# Patient Record
Sex: FEMALE | Race: WHITE | Employment: FULL TIME | URBAN - METROPOLITAN AREA
[De-identification: names, ages, dates, MRNs, and addresses within clinical notes are randomized per-mention and may not be internally consistent; named-entity substitution may affect disease eponyms.]

---

## 2017-07-29 PROBLEM — N60.19 FIBROCYSTIC BREAST DISEASE: Status: ACTIVE | Noted: 2017-07-29

## 2017-07-29 PROBLEM — N60.19 FIBROCYSTIC BREAST DISEASE: Chronic | Status: ACTIVE | Noted: 2017-07-29

## 2017-07-29 PROBLEM — Z88.9 H/O SEASONAL ALLERGIES: Status: ACTIVE | Noted: 2017-07-29

## 2017-08-22 PROBLEM — Z82.79 FAMILY HISTORY OF DOWNS SYNDROME: Status: ACTIVE | Noted: 2017-08-22

## 2017-08-22 PROBLEM — Z83.49 FAMILY HISTORY OF CYSTIC FIBROSIS: Status: ACTIVE | Noted: 2017-08-22

## 2017-10-16 PROBLEM — O44.02 COMPLETE PLACENTA PREVIA NOS OR WITHOUT HEMORRHAGE, SECOND TRIMESTER: Status: ACTIVE | Noted: 2017-10-16

## 2017-11-13 PROBLEM — Z34.90 PREGNANCY: Status: ACTIVE | Noted: 2017-11-13

## 2018-01-11 PROBLEM — O26.849 FETAL SIZE INCONSISTENT WITH DATES: Status: ACTIVE | Noted: 2018-01-11

## 2018-02-20 ENCOUNTER — HOSPITAL ENCOUNTER (OUTPATIENT)
Age: 33
Discharge: HOME | End: 2018-02-20
Attending: OBSTETRICS & GYNECOLOGY | Admitting: OBSTETRICS & GYNECOLOGY
Payer: COMMERCIAL

## 2018-02-20 VITALS
DIASTOLIC BLOOD PRESSURE: 59 MMHG | RESPIRATION RATE: 16 BRPM | WEIGHT: 200 LBS | BODY MASS INDEX: 30.31 KG/M2 | SYSTOLIC BLOOD PRESSURE: 106 MMHG | HEIGHT: 68 IN | HEART RATE: 100 BPM | TEMPERATURE: 98.3 F

## 2018-02-20 PROBLEM — O36.8190 DECREASED FETAL MOVEMENT: Status: ACTIVE | Noted: 2018-02-20

## 2018-02-20 LAB
APPEARANCE UR: CLEAR
BILIRUB UR QL: NEGATIVE
COLOR UR: YELLOW
GLUCOSE UR QL STRIP.AUTO: NEGATIVE MG/DL
KETONES UR-MCNC: NEGATIVE MG/DL
LEUKOCYTE ESTERASE UR QL STRIP: ABNORMAL
NITRITE UR QL: NEGATIVE
PH UR: 6 [PH] (ref 5–9)
PROT UR QL: 30 MG/DL
RBC # UR STRIP: ABNORMAL /UL
SERVICE CMNT-IMP: ABNORMAL
SP GR UR: 1.02 (ref 1–1.02)
UROBILINOGEN UR QL: 0.2 EU/DL (ref 0.2–1)

## 2018-02-20 PROCEDURE — 99282 EMERGENCY DEPT VISIT SF MDM: CPT

## 2018-02-20 PROCEDURE — 81003 URINALYSIS AUTO W/O SCOPE: CPT

## 2018-02-20 PROCEDURE — 59025 FETAL NON-STRESS TEST: CPT

## 2018-02-20 NOTE — IP AVS SNAPSHOT
[address] [address] 
[phone] Patient: Sarah Alcantar MRN: [id number] OTJ:1[birth year]387 A check karma indicates which time of day the medication should be taken. My Medications ASK your doctor about these medications Instructions Each Dose to Equal  
 Morning Noon Evening Bedtime BENADRYL 25 mg capsule Generic drug:  diphenhydrAMINE Your last dose was: Your next dose is: Take 25 mg by mouth every six (6) hours as needed. 25 mg  
    
   
   
   
  
 calcium carbonate 200 mg calcium (500 mg) Chew Commonly known as:  TUMS Your last dose was: Your next dose is: Take 1 Tab by mouth daily. 1 Tab COLACE 100 mg capsule Generic drug:  docusate sodium Your last dose was: Your next dose is: Take 100 mg by mouth two (2) times a day. 100 mg Iron 325 mg (65 mg iron) tablet Generic drug:  ferrous sulfate Your last dose was: Your next dose is: Take  by mouth Daily (before breakfast). levothyroxine 150 mcg tablet Commonly known as:  SYNTHROID Your last dose was: Your next dose is: TAKE 1 TABLET BY ORAL ROUTE EVERY DAY PRENATAL DHA+COMPLETE PRENATAL -300 mg-mcg-mg Cmpk Generic drug:  [id number] rigo-folic-dha Your last dose was: Your next dose is: Take  by mouth. SUCRETS COUGH CONTROL PO Your last dose was: Your next dose is: Take  by mouth. TYLENOL PM PO Your last dose was: Your next dose is: Take  by mouth. Eddie Manrique EX Your last dose was: Your next dose is:    
   
   
 by Apply Externally route. ZANTAC 150 mg tablet Generic drug:  raNITIdine Your last dose was: Your next dose is: Take 150 mg by mouth two (2) times a day.   
 150 mg

## 2018-02-20 NOTE — IP AVS SNAPSHOT
Summary of Care Report The Summary of Care report has been created to help improve care coordination. Users with access to [employer] or [address] (Web-based application) may access additional patient information including the Discharge Summary. If you are not currently a [address] user and need more information, please call the number listed below in the [place] [address] section and ask to be connected with Medical Records. Facility Information Name Address Phone [address] [address] [phone] Patient Information Patient Name Sex  Kranthi Haas ([id number]) Female 1985 Discharge Information Admitting Provider Service Area Unit Travon Lucio MD / [address] [place] [address] [address] Triage / [phone] Discharge Provider Discharge Date/Time Discharge Disposition Destination (none) (none) (none) (none) Patient Language Language ENGLISH [13] Hospital Problems as of 2018  Reviewed: 2018 11:23 AM by Rosey Fair NP Class Noted - Resolved Last Modified POA Active Problems Decreased fetal movement  2018 - Present 2018 by Travon Lucio MD Unknown Entered by Travon Lucio MD  
  
Non-Hospital Problems as of 2018  Reviewed: 2018 11:23 AM by Rosey Fair NP Class Noted - Resolved Last Modified Active Problems History of thyroid cancer (Chronic)  2014 - Present 2018 by Rosey Fair NP Entered by Kathleen Zapata MD  
  Overview Signed 2018  9:31 AM by DOROTHY Jorgensen Dr. H/O seasonal allergies  2017 - Present 2017 by Valentín Dueñas Entered by Valentín Dueñas Fibrocystic breast disease (Chronic)  2017 - Present 2017 by Valentín Dueñas Entered by Marcos Evans Family history of Downs syndrome  8/22/2017 - Present 8/22/2017 by Gary Camilo NP Entered by Gary Camilo NP Overview Signed 8/22/2017  9:39 AM by Gary Camilo NP Declined MFM referral 
  
  
  Family history of cystic fibrosis  8/22/2017 - Present 8/22/2017 by Gayr Camilo NP Entered by Gary Camilo NP Overview Signed 8/22/2017  9:39 AM by Gary Camilo NP Declined CF screening Complete placenta previa nos or without hemorrhage, second trimester  10/16/2017 - Present 2/7/2018 by Gary Camilo NP Entered by Gary Camilo NP Overview Addendum 2/7/2018  9:31 AM by Gary Camilo NP  
   U/S at 28 wks to re-eval - resolved Pelvic rest 
Notify office of any VB immediately Pregnancy  11/13/2017 - Present 11/13/2017 by Juan David Herrera Entered by Juan David Herrera Fetal size inconsistent with dates  1/11/2018 - Present 2/7/2018 by Gary Camilo NP Entered by Juan David Herrera Overview Addendum 2/7/2018  9:31 AM by Gary Camilo NP  
   S>D. Will need US to evaluate. Sched 2/16. You are allergic to the following No active allergies Current Discharge Medication List  
  
ASK your doctor about these medications Dose & Instructions Dispensing Information Comments BENADRYL 25 mg capsule Generic drug:  diphenhydrAMINE Dose:  25 mg Take 25 mg by mouth every six (6) hours as needed. Refills:  0  
   
 calcium carbonate 200 mg calcium (500 mg) Chew Commonly known as:  TUMS Dose:  1 Tab Take 1 Tab by mouth daily. Refills:  0  
   
 COLACE 100 mg capsule Generic drug:  docusate sodium Dose:  100 mg Take 100 mg by mouth two (2) times a day. Refills:  0 Iron 325 mg (65 mg iron) tablet Generic drug:  ferrous sulfate Take  by mouth Daily (before breakfast). Refills:  0  
   
 levothyroxine 150 mcg tablet Commonly known as:  SYNTHROID  
 TAKE 1 TABLET BY ORAL ROUTE EVERY DAY Refills:  3 PRENATAL DHA+COMPLETE PRENATAL -300 mg-mcg-mg Cmpk Generic drug:  [id number] rigo-folic-dha Take  by mouth. Refills:  0 SUCRETS COUGH CONTROL PO Take  by mouth. Refills:  0  
   
 TYLENOL PM PO Take  by mouth. Refills:  0 VICKS VAPORUB EX  
 by Apply Externally route. Refills:  0  
   
 ZANTAC 150 mg tablet Generic drug:  raNITIdine Dose:  150 mg Take 150 mg by mouth two (2) times a day. Refills:  0 Current Immunizations Name Date Influenza Vaccine (Quad) PF 10/16/2017 Follow-up Information None Discharge Instructions Pregnancy Precautions: Care Instructions Your Care Instructions There is no sure way to prevent labor before your due date ( labor) or to prevent most other pregnancy problems. But there are things you can do to increase your chances of a healthy pregnancy. Go to your appointments, follow your doctor's advice, and take good care of yourself. Eat well, and exercise (if your doctor agrees). And make sure to drink plenty of water. Follow-up care is a key part of your treatment and safety. Be sure to make and go to all appointments, and call your doctor if you are having problems. It's also a good idea to know your test results and keep a list of the medicines you take. How can you care for yourself at home? · Make sure you go to your prenatal appointments. At each visit, your doctor will check your blood pressure. Your doctor will also check to see if you have protein in your urine. High blood pressure and protein in urine are signs of preeclampsia. This condition can be dangerous for you and your baby. · Drink plenty of fluids, enough so that your urine is light yellow or clear like water. Dehydration can cause contractions.  If you have kidney, heart, or liver disease and have to limit fluids, talk with your doctor before you increase the amount of fluids you drink. · Tell your doctor right away if you notice any symptoms of an infection, such as: ¨ Burning when you urinate. ¨ A foul-smelling discharge from your vagina. ¨ Vaginal itching. ¨ Unexplained fever. ¨ Unusual pain or soreness in your uterus or lower belly. · Eat a balanced diet. Include plenty of foods that are high in calcium and iron. ¨ Foods high in calcium include milk, cheese, yogurt, almonds, and broccoli. ¨ Foods high in iron include red meat, shellfish, poultry, eggs, beans, raisins, whole-grain bread, and leafy green vegetables. · Do not smoke. If you need help quitting, talk to your doctor about stop-smoking programs and medicines. These can increase your chances of quitting for good. · Do not drink alcohol or use illegal drugs. · Follow your doctor's directions about activity. Your doctor will let you know how much, if any, exercise you can do. · Ask your doctor if you can have sex. If you are at risk for early labor, your doctor may ask you to not have sex. · Take care to prevent falls. During pregnancy, your joints are loose, and your balance is off. Sports such as bicycling, skiing, or in-line skating can increase your risk of falling. And don't ride horses or motorcycles, dive, water ski, scuba dive, or parachute jump while you are pregnant. · Avoid getting very hot. Do not use saunas or hot tubs. Avoid staying out in the sun in hot weather for long periods. Take acetaminophen (Tylenol) to lower a high fever. · Do not take any over-the-counter or herbal medicines or supplements without talking to your doctor or pharmacist first. 
When should you call for help? Call 911 anytime you think you may need emergency care. For example, call if: 
? · You passed out (lost consciousness). ? · You have severe vaginal bleeding. ? · You have severe pain in your belly or pelvis. ? · You have had fluid gushing or leaking from your vagina and you know or think the umbilical cord is bulging into your vagina. If this happens, immediately get down on your knees so your rear end (buttocks) is higher than your head. This will decrease the pressure on the cord until help arrives. ?Call your doctor now or seek immediate medical care if: 
? · You have signs of preeclampsia, such as: 
¨ Sudden swelling of your face, hands, or feet. ¨ New vision problems (such as dimness or blurring). ¨ A severe headache. ? · You have any vaginal bleeding. ? · You have belly pain or cramping. ? · You have a fever. ? · You have had regular contractions (with or without pain) for an hour. This means that you have 8 or more within 1 hour or 4 or more in 20 minutes after you change your position and drink fluids. ? · You have a sudden release of fluid from your vagina. ? · You have low back pain or pelvic pressure that does not go away. ? · You notice that your baby has stopped moving or is moving much less than normal. ? Watch closely for changes in your health, and be sure to contact your doctor if you have any problems. Where can you learn more? Go to http://melany-charo.info/. Enter [id number] in the search box to learn more about \"Pregnancy Precautions: Care Instructions. \" Current as of: March 16, 2017 Content Version: 11.4 © 8672-0037 [employer]. Care instructions adapted under license by [employer] (which disclaims liability or warranty for this information). If you have questions about a medical condition or this instruction, always ask your healthcare professional. [place] [address] any warranty or liability for your use of this information. Chart Review Routing History No Routing History on File

## 2018-02-20 NOTE — PROGRESS NOTES
1708  at 38.3 weeks arrives to the unit with complaints of contractions since 1 this morning and decreased fetal movement. Pt admitted to triage room 3.    1735 SVE: /-2     1810 Pt up to ambulate unit.  Bedside and verbal report given to EBBE Marquez RN

## 2018-02-20 NOTE — IP AVS SNAPSHOT
Petra Goldman 
 
 
 [address] [facility] [address] 
[phone] Patient: Shreya Aguilar MRN: [id number] QZX:1/9/[birth year]011 About your hospitalization You were admitted on:  February 20, 2018 You last received care in the:  THE [place] [address] [facility] You were discharged on:  February 20, 2018 Why you were hospitalized Your primary diagnosis was:  Not on File Your diagnoses also included:  Decreased Fetal Movement Follow-up Information None Your Scheduled Appointments Friday February 23, 2018  8:50 AM EST  
OB VISIT with Dania Vásquez [facility] ([facility]) [address] [place] [id number] [phone] Friday March 02, 2018  9:40 AM EST  
OB VISIT with Hernandez Franklin NP  
[facility] ([address] [place] [address]) [address] [place] [id number] [phone] Discharge Orders None A check karma indicates which time of day the medication should be taken. My Medications ASK your doctor about these medications Instructions Each Dose to Equal  
 Morning Noon Evening Bedtime BENADRYL 25 mg capsule Generic drug:  diphenhydrAMINE Your last dose was: Your next dose is: Take 25 mg by mouth every six (6) hours as needed. 25 mg  
    
   
   
   
  
 calcium carbonate 200 mg calcium (500 mg) Chew Commonly known as:  TUMS Your last dose was: Your next dose is: Take 1 Tab by mouth daily. 1 Tab COLACE 100 mg capsule Generic drug:  docusate sodium Your last dose was: Your next dose is: Take 100 mg by mouth two (2) times a day. 100 mg Iron 325 mg (65 mg iron) tablet Generic drug:  ferrous sulfate Your last dose was: Your next dose is: Take  by mouth Daily (before breakfast). levothyroxine 150 mcg tablet Commonly known as:  SYNTHROID Your last dose was: Your next dose is: TAKE 1 TABLET BY ORAL ROUTE EVERY DAY PRENATAL DHA+COMPLETE PRENATAL -300 mg-mcg-mg Cmpk Generic drug:  [id number] rigo-folic-dha Your last dose was: Your next dose is: Take  by mouth. SUCRETS COUGH CONTROL PO Your last dose was: Your next dose is: Take  by mouth. TYLENOL PM PO Your last dose was: Your next dose is: Take  by mouth. Remus Dopp EX Your last dose was: Your next dose is:    
   
   
 by Apply Externally route. ZANTAC 150 mg tablet Generic drug:  raNITIdine Your last dose was: Your next dose is: Take 150 mg by mouth two (2) times a day. 150 mg Discharge Instructions Pregnancy Precautions: Care Instructions Your Care Instructions There is no sure way to prevent labor before your due date ( labor) or to prevent most other pregnancy problems. But there are things you can do to increase your chances of a healthy pregnancy. Go to your appointments, follow your doctor's advice, and take good care of yourself. Eat well, and exercise (if your doctor agrees). And make sure to drink plenty of water. Follow-up care is a key part of your treatment and safety. Be sure to make and go to all appointments, and call your doctor if you are having problems. It's also a good idea to know your test results and keep a list of the medicines you take. How can you care for yourself at home? · Make sure you go to your prenatal appointments.  At each visit, your doctor will check your blood pressure. Your doctor will also check to see if you have protein in your urine. High blood pressure and protein in urine are signs of preeclampsia. This condition can be dangerous for you and your baby. · Drink plenty of fluids, enough so that your urine is light yellow or clear like water. Dehydration can cause contractions. If you have kidney, heart, or liver disease and have to limit fluids, talk with your doctor before you increase the amount of fluids you drink. · Tell your doctor right away if you notice any symptoms of an infection, such as: ¨ Burning when you urinate. ¨ A foul-smelling discharge from your vagina. ¨ Vaginal itching. ¨ Unexplained fever. ¨ Unusual pain or soreness in your uterus or lower belly. · Eat a balanced diet. Include plenty of foods that are high in calcium and iron. ¨ Foods high in calcium include milk, cheese, yogurt, almonds, and broccoli. ¨ Foods high in iron include red meat, shellfish, poultry, eggs, beans, raisins, whole-grain bread, and leafy green vegetables. · Do not smoke. If you need help quitting, talk to your doctor about stop-smoking programs and medicines. These can increase your chances of quitting for good. · Do not drink alcohol or use illegal drugs. · Follow your doctor's directions about activity. Your doctor will let you know how much, if any, exercise you can do. · Ask your doctor if you can have sex. If you are at risk for early labor, your doctor may ask you to not have sex. · Take care to prevent falls. During pregnancy, your joints are loose, and your balance is off. Sports such as bicycling, skiing, or in-line skating can increase your risk of falling. And don't ride horses or motorcycles, dive, water ski, scuba dive, or parachute jump while you are pregnant. · Avoid getting very hot. Do not use saunas or hot tubs. Avoid staying out in the sun in hot weather for long periods.  Take acetaminophen (Tylenol) to lower a high fever. · Do not take any over-the-counter or herbal medicines or supplements without talking to your doctor or pharmacist first. 
When should you call for help? Call 911 anytime you think you may need emergency care. For example, call if: 
? · You passed out (lost consciousness). ? · You have severe vaginal bleeding. ? · You have severe pain in your belly or pelvis. ? · You have had fluid gushing or leaking from your vagina and you know or think the umbilical cord is bulging into your vagina. If this happens, immediately get down on your knees so your rear end (buttocks) is higher than your head. This will decrease the pressure on the cord until help arrives. ?Call your doctor now or seek immediate medical care if: 
? · You have signs of preeclampsia, such as: 
¨ Sudden swelling of your face, hands, or feet. ¨ New vision problems (such as dimness or blurring). ¨ A severe headache. ? · You have any vaginal bleeding. ? · You have belly pain or cramping. ? · You have a fever. ? · You have had regular contractions (with or without pain) for an hour. This means that you have 8 or more within 1 hour or 4 or more in 20 minutes after you change your position and drink fluids. ? · You have a sudden release of fluid from your vagina. ? · You have low back pain or pelvic pressure that does not go away. ? · You notice that your baby has stopped moving or is moving much less than normal. ? Watch closely for changes in your health, and be sure to contact your doctor if you have any problems. Where can you learn more? Go to http://melany-charo.info/. Enter [id number] in the search box to learn more about \"Pregnancy Precautions: Care Instructions. \" Current as of: March 16, 2017 Content Version: 11.4 © 9830-4860 [employer].  Care instructions adapted under license by [employer] (which disclaims liability or warranty for this information). If you have questions about a medical condition or this instruction, always ask your healthcare professional. [address] any warranty or liability for your use of this information. Introducing [facility] SERVICES! [employer] introduces [employer] patient portal. Now you can access parts of your medical record, email your doctor's office, and request medication refills online. 1. In your internet browser, go to [email or web address] 2. Click on the First Time User? Click Here link in the Sign In box. You will see the New Member Sign Up page. 3. Enter your [employer] Access Code exactly as it appears below. You will not need to use this code after youve completed the sign-up process. If you do not sign up before the expiration date, you must request a new code. · [employer] Access Code: [email or web address] Expires: 3/11/2018  9:45 AM 
 
4. Enter the last four digits of your Social Security Number (xxxx) and Date of Birth (mm/dd/yyyy) as indicated and click Submit. You will be taken to the next sign-up page. 5. Create a [employer] ID. This will be your [employer] login ID and cannot be changed, so think of one that is secure and easy to remember. 6. Create a [employer] password. You can change your password at any time. 7. Enter your Password Reset Question and Answer. This can be used at a later time if you forget your password. 8. Enter your e-mail address. You will receive e-mail notification when new information is available in [address] 9. Click Sign Up. You can now view and download portions of your medical record. 10. Click the Download Summary menu link to download a portable copy of your medical information. If you have questions, please visit the Frequently Asked Questions section of the [employer] website. Remember, [employer] is NOT to be used for urgent needs. For medical emergencies, dial 911. Now available from your iPhone and Android! Providers Seen During Your Hospitalization Provider Specialty Primary office phone Robert Bustamante MD Obstetrics & Gynecology [phone] Your Primary Care Physician (PCP) Primary Care Physician Office Phone Office Fax NONE ** None ** ** None ** You are allergic to the following No active allergies Recent Documentation Height Weight BMI OB Status Smoking Status 1.715 m 90.7 kg 30.86 kg/m2 Pregnant Never Smoker Emergency Contacts Name Discharge Info Relation Home Work Mobile [address] [place] [address] CAREGIVER [3] Spouse [3]   [phone] Patient Belongings The following personal items are in your possession at time of discharge: 
                             
 
  
  
 Please provide this summary of care documentation to your next provider. Signatures-by signing, you are acknowledging that this After Visit Summary has been reviewed with you and you have received a copy. Patient Signature:  ____________________________________________________________ Date:  ____________________________________________________________  
  
Devota Dannestor Provider Signature:  ____________________________________________________________ Date:  ____________________________________________________________

## 2018-02-21 NOTE — PROGRESS NOTES
80- Report received from Orest Mortimer, RN. Patient ambulating hallways. Recheck at 2000. Patient aware and verbalized understanding. 2005- SVE 2/50/-2, posterior, same as previous exam.    2050- Telephone call to Khushi Tabares MD. SBAR report given, strip reviewed including contraction pattern, and SVE. Orders to discharge patient at this time. 2100- I have reviewed discharge instructions with the patient and spouse. The patient and spouse verbalized understanding. Patient ambulated off unit with steady gait.

## 2018-02-21 NOTE — DISCHARGE INSTRUCTIONS
Pregnancy Precautions: Care Instructions  Your Care Instructions    There is no sure way to prevent labor before your due date ( labor) or to prevent most other pregnancy problems. But there are things you can do to increase your chances of a healthy pregnancy. Go to your appointments, follow your doctor's advice, and take good care of yourself. Eat well, and exercise (if your doctor agrees). And make sure to drink plenty of water. Follow-up care is a key part of your treatment and safety. Be sure to make and go to all appointments, and call your doctor if you are having problems. It's also a good idea to know your test results and keep a list of the medicines you take. How can you care for yourself at home? · Make sure you go to your prenatal appointments. At each visit, your doctor will check your blood pressure. Your doctor will also check to see if you have protein in your urine. High blood pressure and protein in urine are signs of preeclampsia. This condition can be dangerous for you and your baby. · Drink plenty of fluids, enough so that your urine is light yellow or clear like water. Dehydration can cause contractions. If you have kidney, heart, or liver disease and have to limit fluids, talk with your doctor before you increase the amount of fluids you drink. · Tell your doctor right away if you notice any symptoms of an infection, such as:  ¨ Burning when you urinate. ¨ A foul-smelling discharge from your vagina. ¨ Vaginal itching. ¨ Unexplained fever. ¨ Unusual pain or soreness in your uterus or lower belly. · Eat a balanced diet. Include plenty of foods that are high in calcium and iron. ¨ Foods high in calcium include milk, cheese, yogurt, almonds, and broccoli. ¨ Foods high in iron include red meat, shellfish, poultry, eggs, beans, raisins, whole-grain bread, and leafy green vegetables. · Do not smoke.  If you need help quitting, talk to your doctor about stop-smoking programs and medicines. These can increase your chances of quitting for good. · Do not drink alcohol or use illegal drugs. · Follow your doctor's directions about activity. Your doctor will let you know how much, if any, exercise you can do. · Ask your doctor if you can have sex. If you are at risk for early labor, your doctor may ask you to not have sex. · Take care to prevent falls. During pregnancy, your joints are loose, and your balance is off. Sports such as bicycling, skiing, or in-line skating can increase your risk of falling. And don't ride horses or motorcycles, dive, water ski, scuba dive, or parachute jump while you are pregnant. · Avoid getting very hot. Do not use saunas or hot tubs. Avoid staying out in the sun in hot weather for long periods. Take acetaminophen (Tylenol) to lower a high fever. · Do not take any over-the-counter or herbal medicines or supplements without talking to your doctor or pharmacist first.  When should you call for help? Call 911 anytime you think you may need emergency care. For example, call if:  ? · You passed out (lost consciousness). ? · You have severe vaginal bleeding. ? · You have severe pain in your belly or pelvis. ? · You have had fluid gushing or leaking from your vagina and you know or think the umbilical cord is bulging into your vagina. If this happens, immediately get down on your knees so your rear end (buttocks) is higher than your head. This will decrease the pressure on the cord until help arrives. ?Call your doctor now or seek immediate medical care if:  ? · You have signs of preeclampsia, such as:  ¨ Sudden swelling of your face, hands, or feet. ¨ New vision problems (such as dimness or blurring). ¨ A severe headache. ? · You have any vaginal bleeding. ? · You have belly pain or cramping. ? · You have a fever. ? · You have had regular contractions (with or without pain) for an hour.  This means that you have 8 or more within 1 hour or 4 or more in 20 minutes after you change your position and drink fluids. ? · You have a sudden release of fluid from your vagina. ? · You have low back pain or pelvic pressure that does not go away. ? · You notice that your baby has stopped moving or is moving much less than normal.   ? Watch closely for changes in your health, and be sure to contact your doctor if you have any problems. Where can you learn more? Go to http://melany-charo.info/. Enter [id number] in the search box to learn more about \"Pregnancy Precautions: Care Instructions. \"  Current as of: March 16, 2017  Content Version: 11.4  © 0116-9076 [employer]. Care instructions adapted under license by [employer] (which disclaims liability or warranty for this information). If you have questions about a medical condition or this instruction, always ask your healthcare professional. [address] any warranty or liability for your use of this information.

## 2018-03-01 ENCOUNTER — ANESTHESIA (OUTPATIENT)
Dept: LABOR AND DELIVERY | Age: 33
End: 2018-03-01
Payer: COMMERCIAL

## 2018-03-01 ENCOUNTER — ANESTHESIA EVENT (OUTPATIENT)
Dept: LABOR AND DELIVERY | Age: 33
End: 2018-03-01
Payer: COMMERCIAL

## 2018-03-01 ENCOUNTER — HOSPITAL ENCOUNTER (INPATIENT)
Age: 33
LOS: 2 days | Discharge: HOME | End: 2018-03-03
Attending: OBSTETRICS & GYNECOLOGY | Admitting: OBSTETRICS & GYNECOLOGY
Payer: COMMERCIAL

## 2018-03-01 PROBLEM — Z34.90 PREGNANCY: Status: RESOLVED | Noted: 2017-11-13 | Resolved: 2018-03-01

## 2018-03-01 PROBLEM — O99.013 ANEMIA DURING PREGNANCY IN THIRD TRIMESTER: Status: ACTIVE | Noted: 2018-03-01

## 2018-03-01 PROBLEM — Z33.1 IUP (INTRAUTERINE PREGNANCY), INCIDENTAL: Status: ACTIVE | Noted: 2018-03-01

## 2018-03-01 LAB
ABO + RH BLD: NORMAL
BASOPHILS # BLD: 0 K/UL (ref 0–0.06)
BASOPHILS NFR BLD: 0 % (ref 0–2)
BLOOD GROUP ANTIBODIES SERPL: NORMAL
DIFFERENTIAL METHOD BLD: ABNORMAL
EOSINOPHIL # BLD: 0.1 K/UL (ref 0–0.4)
EOSINOPHIL NFR BLD: 1 % (ref 0–5)
ERYTHROCYTE [DISTWIDTH] IN BLOOD BY AUTOMATED COUNT: 13.8 % (ref 11.6–14.5)
HCT VFR BLD AUTO: 34.1 % (ref 35–45)
HGB BLD-MCNC: 11.7 G/DL (ref 12–16)
LYMPHOCYTES # BLD: 2.6 K/UL (ref 0.9–3.6)
LYMPHOCYTES NFR BLD: 20 % (ref 21–52)
MCH RBC QN AUTO: 30.2 PG (ref 24–34)
MCHC RBC AUTO-ENTMCNC: 34.3 G/DL (ref 31–37)
MCV RBC AUTO: 88.1 FL (ref 74–97)
MONOCYTES # BLD: 1.1 K/UL (ref 0.05–1.2)
MONOCYTES NFR BLD: 8 % (ref 3–10)
NEUTS SEG # BLD: 9.4 K/UL (ref 1.8–8)
NEUTS SEG NFR BLD: 71 % (ref 40–73)
PLATELET # BLD AUTO: 370 K/UL (ref 135–420)
PMV BLD AUTO: 10.2 FL (ref 9.2–11.8)
RBC # BLD AUTO: 3.87 M/UL (ref 4.2–5.3)
SPECIMEN EXP DATE BLD: NORMAL
WBC # BLD AUTO: 13.2 K/UL (ref 4.6–13.2)

## 2018-03-01 PROCEDURE — 74011250637 HC RX REV CODE- 250/637: Performed by: ADVANCED PRACTICE MIDWIFE

## 2018-03-01 PROCEDURE — 65270000029 HC RM PRIVATE

## 2018-03-01 PROCEDURE — 74011250637 HC RX REV CODE- 250/637: Performed by: OBSTETRICS & GYNECOLOGY

## 2018-03-01 PROCEDURE — 86901 BLOOD TYPING SEROLOGIC RH(D): CPT | Performed by: ADVANCED PRACTICE MIDWIFE

## 2018-03-01 PROCEDURE — 75410000003 HC RECOV DEL/VAG/CSECN EA 0.5 HR

## 2018-03-01 PROCEDURE — 0HQ9XZZ REPAIR PERINEUM SKIN, EXTERNAL APPROACH: ICD-10-PCS | Performed by: OBSTETRICS & GYNECOLOGY

## 2018-03-01 PROCEDURE — 99281 EMR DPT VST MAYX REQ PHY/QHP: CPT

## 2018-03-01 PROCEDURE — 75410000000 HC DELIVERY VAGINAL/SINGLE

## 2018-03-01 PROCEDURE — 77030007879 HC KT SPN EPDRL TELE -B: Performed by: ANESTHESIOLOGY

## 2018-03-01 PROCEDURE — 75410000002 HC LABOR FEE PER 1 HR

## 2018-03-01 PROCEDURE — 74011250636 HC RX REV CODE- 250/636

## 2018-03-01 PROCEDURE — 59025 FETAL NON-STRESS TEST: CPT

## 2018-03-01 PROCEDURE — 74011250636 HC RX REV CODE- 250/636: Performed by: ADVANCED PRACTICE MIDWIFE

## 2018-03-01 PROCEDURE — 85025 COMPLETE CBC W/AUTO DIFF WBC: CPT | Performed by: ADVANCED PRACTICE MIDWIFE

## 2018-03-01 PROCEDURE — 76060000078 HC EPIDURAL ANESTHESIA

## 2018-03-01 RX ORDER — PROMETHAZINE HYDROCHLORIDE 25 MG/ML
25 INJECTION, SOLUTION INTRAMUSCULAR; INTRAVENOUS
Status: DISCONTINUED | OUTPATIENT
Start: 2018-03-01 | End: 2018-03-03 | Stop reason: HOSPADM

## 2018-03-01 RX ORDER — LIDOCAINE HYDROCHLORIDE 10 MG/ML
INJECTION INFILTRATION; PERINEURAL
Status: DISPENSED
Start: 2018-03-01 | End: 2018-03-01

## 2018-03-01 RX ORDER — DIPHENHYDRAMINE HYDROCHLORIDE 50 MG/ML
25 INJECTION, SOLUTION INTRAMUSCULAR; INTRAVENOUS
Status: DISCONTINUED | OUTPATIENT
Start: 2018-03-01 | End: 2018-03-01 | Stop reason: HOSPADM

## 2018-03-01 RX ORDER — FENTANYL CITRATE 50 UG/ML
100 INJECTION, SOLUTION INTRAMUSCULAR; INTRAVENOUS ONCE
Status: DISCONTINUED | OUTPATIENT
Start: 2018-03-01 | End: 2018-03-01 | Stop reason: HOSPADM

## 2018-03-01 RX ORDER — SODIUM CHLORIDE 0.9 % (FLUSH) 0.9 %
5-10 SYRINGE (ML) INJECTION AS NEEDED
Status: DISCONTINUED | OUTPATIENT
Start: 2018-03-01 | End: 2018-03-01 | Stop reason: HOSPADM

## 2018-03-01 RX ORDER — PHENYLEPHRINE HCL IN 0.9% NACL 1 MG/10 ML
80 SYRINGE (ML) INTRAVENOUS AS NEEDED
Status: DISCONTINUED | OUTPATIENT
Start: 2018-03-01 | End: 2018-03-01 | Stop reason: HOSPADM

## 2018-03-01 RX ORDER — OXYTOCIN/RINGER'S LACTATE 20/1000 ML
125 PLASTIC BAG, INJECTION (ML) INTRAVENOUS CONTINUOUS
Status: DISCONTINUED | OUTPATIENT
Start: 2018-03-01 | End: 2018-03-01 | Stop reason: HOSPADM

## 2018-03-01 RX ORDER — IBUPROFEN 400 MG/1
800 TABLET ORAL
Status: DISCONTINUED | OUTPATIENT
Start: 2018-03-01 | End: 2018-03-03 | Stop reason: HOSPADM

## 2018-03-01 RX ORDER — FENTANYL/ROPIVACAINE/NS/PF 2MCG/ML-.1
1-15 PLASTIC BAG, INJECTION (ML) EPIDURAL
Status: DISCONTINUED | OUTPATIENT
Start: 2018-03-01 | End: 2018-03-01 | Stop reason: HOSPADM

## 2018-03-01 RX ORDER — FENTANYL CITRATE 50 UG/ML
INJECTION, SOLUTION INTRAMUSCULAR; INTRAVENOUS
Status: DISPENSED
Start: 2018-03-01 | End: 2018-03-01

## 2018-03-01 RX ORDER — BUTORPHANOL TARTRATE 2 MG/ML
2 INJECTION INTRAMUSCULAR; INTRAVENOUS
Status: DISCONTINUED | OUTPATIENT
Start: 2018-03-01 | End: 2018-03-01 | Stop reason: HOSPADM

## 2018-03-01 RX ORDER — LIDOCAINE HYDROCHLORIDE 10 MG/ML
20 INJECTION, SOLUTION EPIDURAL; INFILTRATION; INTRACAUDAL; PERINEURAL AS NEEDED
Status: DISCONTINUED | OUTPATIENT
Start: 2018-03-01 | End: 2018-03-01 | Stop reason: HOSPADM

## 2018-03-01 RX ORDER — SODIUM CHLORIDE, SODIUM LACTATE, POTASSIUM CHLORIDE, CALCIUM CHLORIDE 600; 310; 30; 20 MG/100ML; MG/100ML; MG/100ML; MG/100ML
125 INJECTION, SOLUTION INTRAVENOUS CONTINUOUS
Status: DISCONTINUED | OUTPATIENT
Start: 2018-03-01 | End: 2018-03-01 | Stop reason: HOSPADM

## 2018-03-01 RX ORDER — HYDROMORPHONE HYDROCHLORIDE 2 MG/ML
1 INJECTION, SOLUTION INTRAMUSCULAR; INTRAVENOUS; SUBCUTANEOUS
Status: DISCONTINUED | OUTPATIENT
Start: 2018-03-01 | End: 2018-03-01 | Stop reason: HOSPADM

## 2018-03-01 RX ORDER — AMOXICILLIN 250 MG
1 CAPSULE ORAL
Status: DISCONTINUED | OUTPATIENT
Start: 2018-03-01 | End: 2018-03-03 | Stop reason: HOSPADM

## 2018-03-01 RX ORDER — NALOXONE HYDROCHLORIDE 0.4 MG/ML
0.2 INJECTION, SOLUTION INTRAMUSCULAR; INTRAVENOUS; SUBCUTANEOUS AS NEEDED
Status: DISCONTINUED | OUTPATIENT
Start: 2018-03-01 | End: 2018-03-01 | Stop reason: HOSPADM

## 2018-03-01 RX ORDER — SODIUM CHLORIDE 0.9 % (FLUSH) 0.9 %
5-10 SYRINGE (ML) INJECTION EVERY 8 HOURS
Status: DISCONTINUED | OUTPATIENT
Start: 2018-03-01 | End: 2018-03-01 | Stop reason: HOSPADM

## 2018-03-01 RX ORDER — OXYTOCIN/RINGER'S LACTATE 20/1000 ML
500 PLASTIC BAG, INJECTION (ML) INTRAVENOUS ONCE
Status: COMPLETED | OUTPATIENT
Start: 2018-03-01 | End: 2018-03-01

## 2018-03-01 RX ORDER — ZOLPIDEM TARTRATE 5 MG/1
5 TABLET ORAL
Status: DISCONTINUED | OUTPATIENT
Start: 2018-03-01 | End: 2018-03-03 | Stop reason: HOSPADM

## 2018-03-01 RX ORDER — OXYCODONE AND ACETAMINOPHEN 5; 325 MG/1; MG/1
2 TABLET ORAL
Status: DISCONTINUED | OUTPATIENT
Start: 2018-03-01 | End: 2018-03-03 | Stop reason: HOSPADM

## 2018-03-01 RX ORDER — NALBUPHINE HYDROCHLORIDE 10 MG/ML
10 INJECTION, SOLUTION INTRAMUSCULAR; INTRAVENOUS; SUBCUTANEOUS
Status: DISCONTINUED | OUTPATIENT
Start: 2018-03-01 | End: 2018-03-01 | Stop reason: HOSPADM

## 2018-03-01 RX ORDER — ACETAMINOPHEN 325 MG/1
650 TABLET ORAL
Status: DISCONTINUED | OUTPATIENT
Start: 2018-03-01 | End: 2018-03-03 | Stop reason: HOSPADM

## 2018-03-01 RX ORDER — MINERAL OIL
30 OIL (ML) ORAL AS NEEDED
Status: COMPLETED | OUTPATIENT
Start: 2018-03-01 | End: 2018-03-01

## 2018-03-01 RX ORDER — METHYLERGONOVINE MALEATE 0.2 MG/ML
0.2 INJECTION INTRAVENOUS AS NEEDED
Status: DISCONTINUED | OUTPATIENT
Start: 2018-03-01 | End: 2018-03-01 | Stop reason: HOSPADM

## 2018-03-01 RX ORDER — LANOLIN ALCOHOL/MO/W.PET/CERES
1 CREAM (GRAM) TOPICAL
Status: DISCONTINUED | OUTPATIENT
Start: 2018-03-02 | End: 2018-03-03 | Stop reason: HOSPADM

## 2018-03-01 RX ORDER — TERBUTALINE SULFATE 1 MG/ML
0.25 INJECTION SUBCUTANEOUS
Status: DISCONTINUED | OUTPATIENT
Start: 2018-03-01 | End: 2018-03-01 | Stop reason: HOSPADM

## 2018-03-01 RX ORDER — NALBUPHINE HYDROCHLORIDE 10 MG/ML
2.5 INJECTION, SOLUTION INTRAMUSCULAR; INTRAVENOUS; SUBCUTANEOUS
Status: DISCONTINUED | OUTPATIENT
Start: 2018-03-01 | End: 2018-03-01 | Stop reason: HOSPADM

## 2018-03-01 RX ORDER — ONDANSETRON 2 MG/ML
4 INJECTION INTRAMUSCULAR; INTRAVENOUS
Status: DISCONTINUED | OUTPATIENT
Start: 2018-03-01 | End: 2018-03-01 | Stop reason: HOSPADM

## 2018-03-01 RX ORDER — FENTANYL/ROPIVACAINE/NS/PF 2MCG/ML-.1
PLASTIC BAG, INJECTION (ML) EPIDURAL
Status: COMPLETED
Start: 2018-03-01 | End: 2018-03-01

## 2018-03-01 RX ADMIN — ROPIVACAINE HYDROCHLORIDE 12 ML/HR: 10 INJECTION, SOLUTION EPIDURAL at 06:12

## 2018-03-01 RX ADMIN — Medication 10000 MILLI-UNITS/HR: at 09:12

## 2018-03-01 RX ADMIN — IBUPROFEN 800 MG: 400 TABLET, FILM COATED ORAL at 15:55

## 2018-03-01 RX ADMIN — PRENATAL WITH FERROUS FUM AND FOLIC ACID 1 TABLET: 3080; 920; 120; 400; 22; 1.84; 3; 20; 10; 1; 12; 200; 27; 25; 2 TABLET ORAL at 17:44

## 2018-03-01 RX ADMIN — Medication 125 ML/HR: at 09:44

## 2018-03-01 RX ADMIN — Medication 30 ML: at 09:12

## 2018-03-01 RX ADMIN — Medication 12 ML/HR: at 06:12

## 2018-03-01 NOTE — PROGRESS NOTES
Received handoff report from Gracia Mendenhall RN. Patient in stable condition. Currently resting in room. No further needs at this time. Call bell within reach. Will continue to monitor.

## 2018-03-01 NOTE — H&P
Ostetrical History and Physical    Subjective:     Date of Admission: 3/1/2018    Patient is a 34 y.o.  @ 39.4 wk admitted with srom cl fluid @ 2355 yesterday and contractions. For Obstetric history, see prenantal.    For Review of Systems, see prenatal    Past Medical History:   Diagnosis Date    Anemia during pregnancy 2014    Anemia during pregnancy     Bilateral fibrocystic breast disease     [facility] [address]     Fibrocystic breast     Headache     Hx of seasonal allergies     Hypothyroidism     Thyroid activity decreased     thyroidectomy 2012; thyroid CA    Thyroid ca ([facility] [address]     Vaginal delivery     thomas    Vitamin D deficiency       Past Surgical History:   Procedure Laterality Date    HX OTHER SURGICAL      radiation    HX THYROIDECTOMY  2012      Prior to Admission medications    Medication Sig Start Date End Date Taking? Authorizing Provider   ferrous sulfate (IRON) 325 mg (65 mg iron) tablet Take  by mouth Daily (before breakfast). Yes Historical Provider   docusate sodium (COLACE) 100 mg capsule Take 100 mg by mouth two (2) times a day. Yes Historical Provider   raNITIdine (ZANTAC) 150 mg tablet Take 150 mg by mouth two (2) times a day. Yes Historical Provider   levothyroxine (SYNTHROID) 150 mcg tablet TAKE 1 TABLET BY ORAL ROUTE EVERY DAY 17  Yes Historical Provider   calcium carbonate (TUMS) 200 mg calcium (500 mg) chew Take 1 Tab by mouth daily. Yes Historical Provider   PNV no.24-iron-folic acid-dha (PRENATAL DHA+COMPLETE PRENATAL) -300 mg-mcg-mg cmpk Take  by mouth. Yes Phys Other, MD   ACETAMINOPHEN/DIPHENHYDRAMINE (TYLENOL PM PO) Take  by mouth.     Historical Provider     No Known Allergies   Social History   Substance Use Topics    Smoking status: Never Smoker    Smokeless tobacco: Never Used    Alcohol use No      Family History   Problem Relation Age of Onset    Cancer Mother    [facility] Migraines Mother     Asthma Mother     Hypertension Father     Cancer Father     Diabetes Father     Asthma Brother     Migraines Maternal Grandmother     Breast Cancer Maternal Grandmother     Cancer Maternal Grandfather     Cancer Other           Objective:     Blood pressure 127/87, pulse 72, temperature 98.4 °F (36.9 °C), height 5' 7\" (1.702 m), weight 90.7 kg (200 lb), last menstrual period 2017, currently breastfeeding. Temp (24hrs), Av.4 °F (36.9 °C), Min:98.3 °F (36.8 °C), Max:98.4 °F (36.9 °C)      HEENT: No pallor, no jaundice, Thyroid and throat normal  RESPIRATORY: Clear to A & P  CVS: pulse reg, HS normal  ABDOMEN: Gravid. Vertex. EFW= 9 lb +-1lb. No abnormal tenderness. Pelvic: Cervix 7,   Effaced: 100%  Station:  -2  Data Review:   Recent Results (from the past 24 hour(s))   CBC WITH AUTOMATED DIFF    Collection Time: 18  3:15 AM   Result Value Ref Range    WBC 13.2 4.6 - 13.2 K/uL    RBC 3.87 (L) 4.20 - 5.30 M/uL    HGB 11.7 (L) 12.0 - 16.0 g/dL    HCT 34.1 (L) 35.0 - 45.0 %    MCV 88.1 74.0 - 97.0 FL    MCH 30.2 24.0 - 34.0 PG    MCHC 34.3 31.0 - 37.0 g/dL    RDW 13.8 11.6 - 14.5 %    PLATELET 860 533 - 954 K/uL    MPV 10.2 9.2 - 11.8 FL    NEUTROPHILS 71 40 - 73 %    LYMPHOCYTES 20 (L) 21 - 52 %    MONOCYTES 8 3 - 10 %    EOSINOPHILS 1 0 - 5 %    BASOPHILS 0 0 - 2 %    ABS. NEUTROPHILS 9.4 (H) 1.8 - 8.0 K/UL    ABS. LYMPHOCYTES 2.6 0.9 - 3.6 K/UL    ABS. MONOCYTES 1.1 0.05 - 1.2 K/UL    ABS. EOSINOPHILS 0.1 0.0 - 0.4 K/UL    ABS. BASOPHILS 0.0 0.0 - 0.06 K/UL    DF AUTOMATED     TYPE & SCREEN    Collection Time: 18  3:15 AM   Result Value Ref Range    Crossmatch Expiration 2018     ABO/Rh(D) O POSITIVE     Antibody screen NEG      Monitor:  Reactivity:present Variability:present Baseline:within normal limits    Assessment:       Term pregnancy in active labor  anemia      Plan:       GBS negative  Monitor maternal fetal response to labor.     Disposition:     Type of admit:In-Patient    I saw and examined patient.     Signed By: Sandra Baugh CNM                         March 1, 2018

## 2018-03-01 NOTE — ANESTHESIA PROCEDURE NOTES
Epidural Block    Start time: 3/1/2018 6:00 AM  End time: 3/1/2018 6:11 AM  Performed by: Boy Marker by: Peter Han     Pre-Procedure  Indication: labor epidural    Preanesthetic Checklist: patient identified, risks and benefits discussed, anesthesia consent, site marked, patient being monitored, timeout performed and anesthesia consent      Epidural:   Patient position:  Seated  Prep region:  Lumbar  Prep: Chlorhexidine    Location:  L3-4    Needle and Epidural Catheter:   Needle Type:  Tuohy  Needle Gauge:  17 G  Injection Technique:  Loss of resistance using saline  Attempts:  1  Catheter Size:  20 G  Catheter at Skin Depth (cm):  8  Depth in Epidural Space (cm):  2  Events: no blood with aspiration, no cerebrospinal fluid with aspiration, no paresthesia and negative aspiration test    Test Dose:  Negative    Assessment:   Catheter Secured:  Tegaderm and tape  Insertion:  Uncomplicated  Patient tolerance:  Patient tolerated the procedure well with no immediate complications  Ropiv 9.7% 13 mls with 100 mcg fentanyl injected following negative aspiration.

## 2018-03-01 NOTE — ANESTHESIA POSTPROCEDURE EVALUATION
3/1/2018  2:51 PM    Laboring Epidural Follow-up Note     Referring physician: Catalina Garcia MD   Patient status post vaginal delivery with labor epidural    Visit Vitals    /63 (BP 1 Location: Left arm, BP Patient Position: Sitting)    Pulse 84    Temp 37.2 °C (99 °F)    Resp 16    Ht 5' 7\" (1.702 m)    Wt 90.7 kg (200 lb)    LMP 05/27/2017 (Exact Date)    SpO2 100%    Breastfeeding Yes    BMI 31.32 kg/m2       Epidural removed by L&D staff  Site clean, dry intact. No complications. Patient reports sensation and mobility have returned to baseline. Patient reports adequate analgesia during delivery.       Marion Corbin, ZENIA

## 2018-03-01 NOTE — PROGRESS NOTES
0114 Pt arrived ambulatory to unit with c/o of SROM. Pt is 39 1. . Assisted pt to room 7. Pt asked to change into gown. EFM placed. 0130 Nitrazine positive. SVE 4-5/50/-2.     0155 Notified ABDULAZIZ Gerardo CNM on SROM, positive nitrazine, SVE 4-5/50/-2, reactive strip and contraction pattern. Admission orders received. Orders for intermittent fetal monitoring received. 0210 IV placed. Labs drawn. Assessment complete. VSS.     0300 Pt resting. Call light within reach. 0400 Pt vomiting. SVE 7/50/-1     0405 Notified ABDULAZIZ Gerardo WARNERM on pt status. CNM on her way to unit. 5356 CMN at bedside. POC discussed with pt.     0505 Pt taken off monitor. Up to shower. 9499 Pt stated she wants an epidural.     Osvaldo Ruvalcaba MD notified. On his way to unit. 0535 Pt placed on EFM. 0720 Bedside shift change report given to BREANNA Quiros RN and TEX Nuñez RN   (oncoming nurse) by LEONEL Sharpe RN and Doris Day RN  (offgoing nurse). Report included the following information SBAR, Kardex, Intake/Output, MAR and Recent Results.

## 2018-03-01 NOTE — ANESTHESIA PREPROCEDURE EVALUATION
Anesthetic History   No history of anesthetic complications            Review of Systems / Medical History  Patient summary reviewed, nursing notes reviewed and pertinent labs reviewed    Pulmonary  Within defined limits                 Neuro/Psych   Within defined limits           Cardiovascular  Within defined limits                Exercise tolerance: >4 METS     GI/Hepatic/Renal  Within defined limits              Endo/Other      Hypothyroidism: well controlled  Obesity     Other Findings              Physical Exam    Airway  Mallampati: II  TM Distance: 4 - 6 cm  Neck ROM: normal range of motion   Mouth opening: Normal     Cardiovascular    Rhythm: regular  Rate: normal         Dental  No notable dental hx       Pulmonary  Breath sounds clear to auscultation               Abdominal  GI exam deferred       Other Findings            Anesthetic Plan    ASA: 2  Anesthesia type: epidural            Anesthetic plan and risks discussed with: Patient

## 2018-03-01 NOTE — PROGRESS NOTES
Received handoff report from Guru Coles RN. Patient in stable condition. Currently resting in room. Call bell in reach. No further needs reported at this time.

## 2018-03-01 NOTE — PROGRESS NOTES
710 Bedside and Verbal shift change report given to BREANNA Carreon (oncoming nurse) by LEONEL Sharpe and Lieutenant Nyasia RN (offgoing nurse). Report included the following information SBAR, Kardex, Intake/Output, MAR, Recent Results, Med Rec Status and Alarm Parameters .      720 SVE by this nurse 10/100/+2     :   0755 Provider in room  805 Pushing  812 delivery  817 placenta  1st degree lac w/repair and 400 EBL

## 2018-03-01 NOTE — ROUTINE PROCESS
0720:  Bedside and Verbal shift change report given to Pascual Katz, ANKITA and Rogerio Dance, RN (oncoming nurse) by Alyson Abel RN and LEONEL Thomason RN (offgoing nurse). Report included the following information SBAR, MAR and Recent Results. Alarm parameters reviewed and opportunities for questions provided.

## 2018-03-01 NOTE — PROGRESS NOTES
2480 Dr. Jw Catherine at bedside for epidural placement. Procedure explained by Dr. Jw Catherine.  Consent signed     0606 Time Out completed    0608 Epidural Cath placed and procedure complete, pt tolerated procedure well.    0609 Test dose administered by Anesthesiologist     8161 \[id number]\Fentanyl handed to Dr Jw Catherine  and PCEA started, settings read to Dr. Jw Catherine., and pain management explained to patient and family

## 2018-03-02 LAB
HCT VFR BLD AUTO: 28.6 % (ref 35–45)
HGB BLD-MCNC: 9.5 G/DL (ref 12–16)

## 2018-03-02 PROCEDURE — 65270000029 HC RM PRIVATE

## 2018-03-02 PROCEDURE — 74011250637 HC RX REV CODE- 250/637: Performed by: OBSTETRICS & GYNECOLOGY

## 2018-03-02 PROCEDURE — 85014 HEMATOCRIT: CPT | Performed by: OBSTETRICS & GYNECOLOGY

## 2018-03-02 PROCEDURE — 36415 COLL VENOUS BLD VENIPUNCTURE: CPT | Performed by: OBSTETRICS & GYNECOLOGY

## 2018-03-02 PROCEDURE — 85018 HEMOGLOBIN: CPT | Performed by: OBSTETRICS & GYNECOLOGY

## 2018-03-02 RX ADMIN — PRENATAL WITH FERROUS FUM AND FOLIC ACID 1 TABLET: 3080; 920; 120; 400; 22; 1.84; 3; 20; 10; 1; 12; 200; 27; 25; 2 TABLET ORAL at 10:42

## 2018-03-02 RX ADMIN — IBUPROFEN 800 MG: 400 TABLET, FILM COATED ORAL at 06:41

## 2018-03-02 RX ADMIN — IBUPROFEN 800 MG: 400 TABLET, FILM COATED ORAL at 13:35

## 2018-03-02 RX ADMIN — FERROUS SULFATE TAB 325 MG (65 MG ELEMENTAL FE) 325 MG: 325 (65 FE) TAB at 10:42

## 2018-03-02 RX ADMIN — IBUPROFEN 800 MG: 400 TABLET, FILM COATED ORAL at 20:38

## 2018-03-02 RX ADMIN — LEVOTHYROXINE SODIUM 150 MCG: 125 TABLET ORAL at 06:38

## 2018-03-02 NOTE — PROGRESS NOTES
Bedside and Verbal shift change report given to ABDULAZIZ Mahajan RN (oncoming nurse) by Kelly Centeno. Selam Carrillo Rn (offgoing nurse). Report included the following information Kardex, ED Summary, Intake/Output, MAR and Recent Results.

## 2018-03-02 NOTE — PROGRESS NOTES
Patient was visited by [facility] volunteer Shala Castañeda. Volunteer conducted a Spiritual Care Screening and reported no needs to this . Baby [place] Card and Spiritual Care literature were provided. Chaplains will continue to follow and will provide pastoral care as needed or requested. [address], M.Div.   Board Certified   [phone] - Office

## 2018-03-02 NOTE — PROGRESS NOTES
3093 Bedside and Verbal shift change report given to SAI Carlin RN (oncoming nurse) by Kimi Snowden. Elizabeth Fields RN (offgoing nurse). Report included the following information SBAR, Procedure Summary, Intake/Output, MAR and Recent Results.

## 2018-03-02 NOTE — PROGRESS NOTES
Post-Partum Day Number 1 Progress Note    Loan Mukherjee Bimble     Assessment:   Hospital Problems  Date Reviewed: 3/2/2018          Codes Class Noted POA    Anemia during pregnancy in third trimester ICD-10-CM: O99.013  ICD-9-CM: 648.23  3/1/2018 Yes        Perineal laceration, first degree, delivered ICD-10-CM: O70.0  ICD-9-CM: 664.01  3/1/2018 No        Normal spontaneous vaginal delivery ICD-10-CM: O80  ICD-9-CM: 693  2014 Yes        History of thyroid cancer (Chronic) ICD-10-CM: Z85.850  ICD-9-CM: V10.87  2014 Yes    Overview Signed 2018  9:31 AM by DOROTHY [address] Dr.                 Doing well, post partum day 1    Plan:  1. Continue routine postpartum and perineal care as well as maternal education. 2. Plan for discharge tomorrow. Information for the patient's :  Bimble, Roula Britt [[id number]]   Vaginal, Spontaneous Delivery   Patient doing well without significant complaint. Voiding without difficulty, normal lochia. Nursing without problems. Current Facility-Administered Medications   Medication Dose Route Frequency    ferrous sulfate tablet 325 mg  1 Tab Oral DAILY WITH BREAKFAST    levothyroxine (SYNTHROID) tablet 150 mcg  150 mcg Oral 6am    prenatal vit-calcium-iron-fa (PRENATAL PLUS with CALCIUM) tablet 1 Tab  1 Tab Oral DAILY       Vitals:  Visit Vitals    /66 (BP 1 Location: Left arm, BP Patient Position: At rest)    Pulse 77    Temp 98.3 °F (36.8 °C)    Resp 18    Ht 5' 7\" (1.702 m)    Wt 200 lb (90.7 kg)    LMP 2017 (Exact Date)    SpO2 98%    Breastfeeding Yes    BMI 31.32 kg/m2     Temp (24hrs), Av.6 °F (37 °C), Min:98.3 °F (36.8 °C), Max:99 °F (37.2 °C)        Exam:   Patient without distress. Abdomen soft, fundus firm, nontender                Perineum with normal lochia noted. Lower extremities are negative for swelling, cords or tenderness.     Labs:     Lab Results   Component Value Date/Time    WBC 13.2 03/01/2018 03:15 AM    WBC 13.2 08/10/2014 10:50 AM    WBC 7.6 01/10/2014 05:42 PM    HGB 9.5 (L) 03/02/2018 03:37 AM    HGB 11.7 (L) 03/01/2018 03:15 AM    HGB 9.3 (L) 12/11/2017 01:56 PM    HCT 28.6 (L) 03/02/2018 03:37 AM    HCT 34.1 (L) 03/01/2018 03:15 AM    HCT 27.5 (L) 08/12/2014 05:52 AM    PLATELET 467 07/10/2174 03:15 AM    PLATELET 262 82/80/4745 10:50 AM    PLATELET 037 80/82/5085 05:42 PM    Hgb, External 9.3 12/11/2017    Hgb, External 12.3 07/31/2017    Hct, External 37.1 07/31/2017    Platelet cnt., External 381 07/31/2017       Recent Results (from the past 24 hour(s))   HEMOGLOBIN    Collection Time: 03/02/18  3:37 AM   Result Value Ref Range    HGB 9.5 (L) 12.0 - 16.0 g/dL   HEMATOCRIT    Collection Time: 03/02/18  3:37 AM   Result Value Ref Range    HCT 28.6 (L) 35.0 - 45.0 %

## 2018-03-02 NOTE — PROGRESS NOTES
Received handoff report from ABDULAZIZ Griggs RN. Patient resting in room. No further needs at this time. Call bell within reach. Will continue to monitor.

## 2018-03-02 NOTE — L&D DELIVERY NOTE
Delivery Summary    Patient: Candace Reed MRN: [id number]  SSN: [id number]    YOB: 1985  Age: 34 y.o. Sex: female        Labor Events:    Labor: No    Rupture Date: 3/1/2018    Rupture Time: 12:00 AM    Rupture Type SROM    Amniotic Fluid Volume:      Amniotic Fluid Description:         Induction: None        Augmentation: None    Labor Complications: None     Additional Complications:        Cervical Ripening:       None      Delivery Events:  Episiotomy: None    Laceration(s): First degree perineal      Repaired: Yes     Number of Repair Packets: 1    Suture Type and Size:         Estimated Blood Loss (ml): 400        Information for the patient's :  Isacarlos eduardo Thomas [[id number]]     Delivery Summary - Baby    Delivery Date: 3/1/2018   Delivery Time: 8:12 AM   Delivery Type: Vaginal, Spontaneous Delivery  Sex:  female  Gestational Age: 39w5d  Delivery Clinician:  Edgardo Shepard  Living?: Living   Delivery Location: L&D br7           APGARS  One minute Five minutes Ten minutes   Skin Color: 0    0       Heart Rate: 2   2         Reflex Irritability: 2   2         Muscle Tone: 2   2       Respiration: 2   2         Total: 8   8           Presentation: Vertex  Position: Right Occiput Anterior  Resuscitation Method:  Suctioning-bulb;Suctioning-deep; Tactile Stimulation     Meconium Stained: None    Cord Information: 3 Vessels   Complications: None  Cord Blood Sent?:  Yes    Blood Gases Sent?:  No    Placenta:  Date/Time: 3/1  8:17 AM  Removal: Spontaneous      Appearance: Normal      Measurements:  Birth Weight: 9 lb 6.4 oz (4.264 kg)    Birth Length: 1' 7\" (0.483 m)   Head Circumference: 1' 1.78\" (0.35 m)     Chest Circumference: 1' 2.76\" (0.375 m)    Abdominal Girth: 1' 1.78\" (0.35 m)    Other Providers:   DAVID Blood;MARTHA HUNT;;MARY ANN BURT;;;;;MATTHEW FULLER Obstetrician;Primary Nurse;Primary  Nurse;Nicu Nurse;Neonatologist;Anesthesiologist;Crna;Nurse Practitioner;Midwife;Nursery Nurse           Cord Blood Results:  Information for the patient's :  Bryannasam Santoyo [[id number]]   No results found for: ABORH, PCTABR, PCTDIG, BILI, ABORHEXT, [address]    Information for the patient's :  Bryanna Santoyo [[id number]]   No results found for: APH, APCO2, APO2, AHCO3, ABEC, ABDC, O2ST, SITE, RSCOM, PHI, PCO2I, PO2I, HCO3I, SO2I, IBD     Information for the patient's :  Bryannasam Santoyo [[id number]]   No results found for: EPHV, PCO2V, PO2V, HCO3V, O2STV, EBDV

## 2018-03-03 VITALS
HEIGHT: 67 IN | DIASTOLIC BLOOD PRESSURE: 65 MMHG | HEART RATE: 72 BPM | TEMPERATURE: 98.4 F | RESPIRATION RATE: 16 BRPM | WEIGHT: 200 LBS | BODY MASS INDEX: 31.39 KG/M2 | SYSTOLIC BLOOD PRESSURE: 103 MMHG | OXYGEN SATURATION: 99 %

## 2018-03-03 PROCEDURE — 74011250637 HC RX REV CODE- 250/637: Performed by: OBSTETRICS & GYNECOLOGY

## 2018-03-03 RX ORDER — IBUPROFEN 800 MG/1
800 TABLET ORAL
Qty: 60 TAB | Refills: 1 | Status: SHIPPED | OUTPATIENT
Start: 2018-03-03 | End: 2018-04-13

## 2018-03-03 RX ADMIN — LEVOTHYROXINE SODIUM 150 MCG: 125 TABLET ORAL at 06:08

## 2018-03-03 RX ADMIN — PRENATAL WITH FERROUS FUM AND FOLIC ACID 1 TABLET: 3080; 920; 120; 400; 22; 1.84; 3; 20; 10; 1; 12; 200; 27; 25; 2 TABLET ORAL at 10:00

## 2018-03-03 RX ADMIN — FERROUS SULFATE TAB 325 MG (65 MG ELEMENTAL FE) 325 MG: 325 (65 FE) TAB at 10:00

## 2018-03-03 NOTE — PROGRESS NOTES
Bedside and Verbal shift change report given to [place] RN (oncoming nurse) by Melinda Fargoso. Austin Hankins RN (offgoing nurse). Report included the following information SBAR, Kardex, Intake/Output, MAR and Recent Results.

## 2018-03-03 NOTE — PROGRESS NOTES
Bedside change of shift report given to ABDULAZIZ Hines RN by Asenath Habermann, RN. Report included: SBAR  information, shift assessment findings and status changes if any.

## 2018-03-03 NOTE — PROGRESS NOTES
Received pt in bed at rest with  at bedside. Pt alert and oriented. Care board updated and pt advised of shift plan of care for herself and . Postpartum and pain assessment completed. Pt rated pain  5   out of 10. Motrin given at request. OOB  Ad lelia and voiding without difficulty. Discharge teaching completed. Will continue to monitor.

## 2018-03-03 NOTE — PROGRESS NOTES
Progress Note    Patient: Jenell Habermann [place] MRN: [id number]  SSN: [id number]    YOB: 1985  Age: 34 y.o. Sex: female      Subjective:     Postpartum Day: 2 Vaginal Delivery    The patient is without complaints. The patient is ambulating well. The patient  tolerating a normal diet. Flatus has been passed. The baby is well. Objective:      Patient Vitals for the past 8 hrs:   BP Temp Pulse Resp SpO2   18 0551 103/65 98.1 °F (36.7 °C) 72 16 99 %     LABS: No results found for this or any previous visit (from the past 24 hour(s)). Lochia:  appropriate   Uterine Fundus:   firm -1     Lab/Data Review: All lab results for the last 24 hours reviewed. Assessment:     Status post: Doing well postpartum vaginal delivery day 2. Plan:     Continue day 2 post-vaginal delivery orders, discharge today. Postpartum care discussed including diet, ambulation, and actvitiy restrictions. Patient is breastfeeding. Continue pre- vitamins. Medications as per MRF and discharge instructions. . Follow-up in six weeks.       Signed By: Kaleb Watson CNM     March 3, 2018

## 2018-03-03 NOTE — LACTATION NOTE
Per mom, infant latching and nursing well. Discharge teaching completed and support group recommended.

## 2018-03-03 NOTE — PROGRESS NOTES
Problem: Falls - Risk of  Goal: *Absence of Falls  Document Shirin Fall Risk and appropriate interventions in the flowsheet.    Outcome: Progressing Towards Goal  Fall Risk Interventions:

## 2018-03-03 NOTE — PROGRESS NOTES
0700  Patient report received from Noemi Beyer.    0830  No complaints of pain refused pain medication. 0900  Unable to take po medications too close to synthroid medication, will give later. 1100  Medications given po at this time. 1200  Discharge order given, will d/c to home after teaching. 1356  Patient discharged to home with baby.

## 2018-03-06 NOTE — DISCHARGE SUMMARY
Obstetrical Discharge Summary     Name: Flora Bone MRN: [id number]  SSN: [id number]    YOB: 1985  Age: 34 y.o. Sex: female      Admit Date: 3/1/2018    Discharge Date: 3/3/2018    Admitting Physician: Sosa Lemons MD     Attending Physician:  No att. providers found     Discharge Diagnoses:   Information for the patient's :  Anastasia, Dat Aquino [[id number]]   Delivery of a 9 lb 6.4 oz (4.264 kg) female infant via Vaginal, Spontaneous Delivery on 3/1/2018 at 9:16 AM  by . Apgars were 8 and 8. Additional Diagnoses:   Problem List as of 3/3/2018  Date Reviewed: 3/2/2018          Codes Class Noted - Resolved    Anemia during pregnancy in third trimester ICD-10-CM: O99.013  ICD-9-CM: 648.23  3/1/2018 - Present        Perineal laceration, first degree, delivered ICD-10-CM: O70.0  ICD-9-CM: 664.01  3/1/2018 - Present        IUP (intrauterine pregnancy), incidental ICD-10-CM: Z34.90  ICD-9-CM: V22.2  3/1/2018 - Present        Decreased fetal movement ICD-10-CM: O36.8190  ICD-9-CM: 655.70  2018 - Present        Fetal size inconsistent with dates ICD-10-CM: O26.849  ICD-9-CM: 649.60  2018 - Present    Overview Addendum 2018  9:31 AM by Valentina Lambert NP     S>D. Will need US to evaluate. Sched .              Complete placenta previa nos or without hemorrhage, second trimester ICD-10-CM: O44.02  ICD-9-CM: 641.03  10/16/2017 - Present    Overview Addendum 2018  9:31 AM by Valentina Lambert NP     U/S at 28 wks to re-eval - resolved  Pelvic rest  Notify office of any VB immediately             Family history of Downs syndrome ICD-10-CM: Z82.79  ICD-9-CM: V19.5  2017 - Present    Overview Signed 2017  9:39 AM by Valentina Lambert NP     Declined MFM referral             Family history of cystic fibrosis ICD-10-CM: Z83.49  ICD-9-CM: V18.19  2017 - Present    Overview Signed 2017  9:39 AM by Valentina Labmert NP     Declined CF screening             H/O seasonal allergies ICD-10-CM: Z88.9  ICD-9-CM: V15.09  7/29/2017 - Present        Fibrocystic breast disease (Chronic) ICD-10-CM: N60.19  ICD-9-CM: 610.1  7/29/2017 - Present        Normal spontaneous vaginal delivery ICD-10-CM: O80  ICD-9-CM: 678  8/11/2014 - Present        History of thyroid cancer (Chronic) ICD-10-CM: Z85.850  ICD-9-CM: V10.87  8/11/2014 - Present    Overview Signed 2/7/2018  9:31 AM by DOROTHY Maria Dr.             * (Principal)RESOLVED: Pregnancy ICD-10-CM: Z34.90  ICD-9-CM: V22.2  11/13/2017 - 3/1/2018        RESOLVED: Anemia during pregnancy ICD-10-CM: O99.019  ICD-9-CM: 648.20  8/11/2014 - 9/20/2017        RESOLVED: Meconium stained amniotic fluid, delivered, current hospitalization ICD-10-CM: O77.0  ICD-9-CM: 656.81  8/11/2014 - 9/20/2017        RESOLVED: Normal IUP (intrauterine pregnancy) on prenatal ultrasound ICD-10-CM: Z34.90  ICD-9-CM: V22.2  8/10/2014 - 8/13/2014              Lab Results   Component Value Date/Time    Rubella, External immune 07/31/2017    GrBStrep, External negative 02/07/2018     No results for input(s): HGB, HGBEXT in the last 72 hours. Hospital Course: Normal hospital course following the delivery. Patient Instructions:   Cannot display discharge medications since this patient is not currently admitted. Reference my discharge instructions.     Follow-up Appointments   Procedures    FOLLOW UP VISIT Appointment in: 6 Weeks     Standing Status:   Standing     Number of Occurrences:   1     Order Specific Question:   Appointment in     Answer:   6 Weeks        Signed By:  Edwina Fields MD     March 6, 2018                       BST

## 2018-04-13 PROBLEM — Z83.49 FAMILY HISTORY OF CYSTIC FIBROSIS: Status: RESOLVED | Noted: 2017-08-22 | Resolved: 2018-04-13

## 2018-04-13 PROBLEM — Z33.1 IUP (INTRAUTERINE PREGNANCY), INCIDENTAL: Status: RESOLVED | Noted: 2018-03-01 | Resolved: 2018-04-13

## 2018-04-13 PROBLEM — Z82.79 FAMILY HISTORY OF DOWNS SYNDROME: Status: RESOLVED | Noted: 2017-08-22 | Resolved: 2018-04-13

## 2018-04-13 PROBLEM — O36.8190 DECREASED FETAL MOVEMENT: Status: RESOLVED | Noted: 2018-02-20 | Resolved: 2018-04-13

## 2018-04-13 PROBLEM — O26.849 FETAL SIZE INCONSISTENT WITH DATES: Status: RESOLVED | Noted: 2018-01-11 | Resolved: 2018-04-13

## 2018-04-13 PROBLEM — O44.02 COMPLETE PLACENTA PREVIA NOS OR WITHOUT HEMORRHAGE, SECOND TRIMESTER: Status: RESOLVED | Noted: 2017-10-16 | Resolved: 2018-04-13

## 2018-04-13 PROBLEM — O99.013 ANEMIA DURING PREGNANCY IN THIRD TRIMESTER: Status: RESOLVED | Noted: 2018-03-01 | Resolved: 2018-04-13

## 2021-05-26 PROBLEM — N83.201 RIGHT OVARIAN CYST: Status: ACTIVE | Noted: 2021-05-26

## 2021-07-28 PROBLEM — N83.201 RIGHT OVARIAN CYST: Status: RESOLVED | Noted: 2021-05-26 | Resolved: 2021-07-28
